# Patient Record
Sex: FEMALE | Race: OTHER | Employment: OTHER | URBAN - METROPOLITAN AREA
[De-identification: names, ages, dates, MRNs, and addresses within clinical notes are randomized per-mention and may not be internally consistent; named-entity substitution may affect disease eponyms.]

---

## 2017-10-27 ENCOUNTER — HOSPITAL ENCOUNTER (OUTPATIENT)
Dept: ULTRASOUND IMAGING | Age: 77
Discharge: HOME | End: 2017-10-27
Attending: NURSE PRACTITIONER
Payer: MEDICARE

## 2017-10-27 ENCOUNTER — HOSPITAL ENCOUNTER (OUTPATIENT)
Dept: GENERAL RADIOLOGY | Age: 77
Discharge: HOME | End: 2017-10-27
Attending: NURSE PRACTITIONER
Payer: MEDICARE

## 2017-10-27 DIAGNOSIS — M54.9 BACK PAIN: ICD-10-CM

## 2017-10-27 DIAGNOSIS — R19.06 EPIGASTRIC MASS: ICD-10-CM

## 2017-10-27 PROCEDURE — 76700 US EXAM ABDOM COMPLETE: CPT

## 2017-10-27 PROCEDURE — 72072 X-RAY EXAM THORAC SPINE 3VWS: CPT

## 2018-04-26 ENCOUNTER — HOSPITAL ENCOUNTER (OUTPATIENT)
Dept: ULTRASOUND IMAGING | Age: 78
Discharge: HOME | End: 2018-04-26
Attending: NURSE PRACTITIONER
Payer: MEDICARE

## 2018-04-26 DIAGNOSIS — R22.2 MASS IN CHEST: ICD-10-CM

## 2018-04-26 PROCEDURE — 76705 ECHO EXAM OF ABDOMEN: CPT

## 2018-05-15 ENCOUNTER — HOSPITAL ENCOUNTER (OUTPATIENT)
Dept: CT IMAGING | Age: 78
Discharge: HOME | End: 2018-05-15
Attending: NURSE PRACTITIONER
Payer: MEDICARE

## 2018-05-15 DIAGNOSIS — R19.06 EPIGASTRIC MASS: ICD-10-CM

## 2018-05-15 DIAGNOSIS — R10.13 EPIGASTRIC PAIN: ICD-10-CM

## 2018-05-15 LAB — CREAT UR-MCNC: 0.6 MG/DL (ref 0.6–1.3)

## 2018-05-15 PROCEDURE — 82565 ASSAY OF CREATININE: CPT

## 2018-05-15 PROCEDURE — 74011000255 HC RX REV CODE- 255: Performed by: RADIOLOGY

## 2018-05-15 PROCEDURE — 74177 CT ABD & PELVIS W/CONTRAST: CPT

## 2018-05-15 PROCEDURE — 74011636320 HC RX REV CODE- 636/320: Performed by: RADIOLOGY

## 2018-05-15 RX ORDER — BARIUM SULFATE 20 MG/ML
900 SUSPENSION ORAL
Status: COMPLETED | OUTPATIENT
Start: 2018-05-15 | End: 2018-05-15

## 2018-05-15 RX ADMIN — BARIUM SULFATE 900 ML: 20 SUSPENSION ORAL at 09:02

## 2018-05-15 RX ADMIN — IOPAMIDOL 90 ML: 612 INJECTION, SOLUTION INTRAVENOUS at 09:02

## 2018-05-31 ENCOUNTER — OFFICE VISIT (OUTPATIENT)
Dept: SURGERY | Age: 78
End: 2018-05-31

## 2018-05-31 VITALS
OXYGEN SATURATION: 95 % | HEIGHT: 66 IN | HEART RATE: 70 BPM | BODY MASS INDEX: 18.23 KG/M2 | SYSTOLIC BLOOD PRESSURE: 103 MMHG | WEIGHT: 113.4 LBS | RESPIRATION RATE: 20 BRPM | DIASTOLIC BLOOD PRESSURE: 53 MMHG | TEMPERATURE: 97.2 F

## 2018-05-31 DIAGNOSIS — R10.84 GENERALIZED ABDOMINAL PAIN: Primary | ICD-10-CM

## 2018-05-31 NOTE — PROGRESS NOTES
Patient presents today for possible hernia. Having pain in upper abdomen located behind her ribcage.

## 2018-05-31 NOTE — MR AVS SNAPSHOT
[address] [place] [address] [phone] 
[phone] Patient: Dora Feldman MRN: [id number]:[id number] Visit Information Date & Time Provider Department Dept. Phone Encounter #  
 5/31/2018  3:30 PM Kenneth Wayne MD [address] [phone] [id number] Upcoming Health Maintenance Date Due DTaP/Tdap/Td series (1 - Tdap) 6/16/1959 ZOSTER VACCINE AGE 60> 4/16/1998 GLAUCOMA SCREENING Q2Y 6/16/2003 Bone Densitometry (Dexa) Screening 6/16/2003 Pneumococcal 65+ Low/Medium Risk (1 of 2 - PCV13) 6/16/2003 MEDICARE YEARLY EXAM 3/14/2018 Influenza Age 5 to Adult 8/1/2018 Allergies as of 5/31/2018  Review Complete On: 5/31/2018 By: Marleni Marroquin LPN Severity Noted Reaction Type Reactions Adenosine  01/12/2015    Shortness of Breath Adenosine  01/22/2016   Intolerance Other (comments) Allegra [Fexofenadine]  01/12/2015    Shortness of Breath Sebastian Hawking [Fexofenadine]  01/22/2016    Hives Cardizem [Diltiazem Hcl]  01/12/2015    Shortness of Breath, Vertigo Rapid heart beat, hands shaking,. Headaches Cardizem [Diltiazem Hcl]  01/22/2016   Intolerance Other (comments) Levalbuterol  01/12/2015    Shortness of Breath Montelukast  01/12/2015    Rash Mucomyst [Acetylcysteine]  01/22/2016    Shortness of Breath, Other (comments) Wheezing Singulair [Montelukast]  01/22/2016    Rash, Itching Mild Zileuton  01/22/2016    Rash, Itching Mild Zyflo [Zileuton]  01/12/2015    Rash Current Immunizations  Never Reviewed No immunizations on file. Not reviewed this visit Vitals BP Pulse Temp Resp Height(growth percentile) Weight(growth percentile) 103/53 (BP 1 Location: Right arm, BP Patient Position: Sitting) 70 97.2 °F (36.2 °C) 20 5' 5.5\" (1.664 m) 113 lb 6.4 oz (51.4 kg) SpO2 BMI OB Status Smoking Status 95% 18.58 kg/m2 Hysterectomy Former Smoker BMI and BSA Data Body Mass Index Body Surface Area 18.58 kg/m 2 1.54 m 2 Preferred Pharmacy Pharmacy Name Phone [facility] [address]  [facility] [phone] Your Updated Medication List  
  
   
This list is accurate as of 5/31/18  3:40 PM.  Always use your most recent med list.  
  
  
  
  
 ALBUTEROL IN Take  by inhalation. aspirin delayed-release 81 mg tablet Take 81 mg by mouth daily. CALCIUM + D PO Take 2 tablets by mouth daily. guaiFENesin-codeine 100-10 mg/5 mL solution Commonly known as:  ROBITUSSIN AC Take 5 mL by mouth three (3) times daily as needed for Cough or Congestion. JESSE PO Take  by mouth. ondansetron 4 mg disintegrating tablet Commonly known as:  ZOFRAN ODT Take 1 tablet by mouth every eight (8) hours as needed for Nausea. [address] 7 Take 1 tablet by mouth daily. PRILOSEC PO Take 1 tablet by mouth daily. SPIRIVA WITH HANDIHALER 18 mcg inhalation capsule Generic drug:  tiotropium Take 1 capsule by inhalation daily. SYMBICORT IN Take 2 puffs by inhalation two (2) times a day. THEOPHYLLINE (BULK)  mg by Does Not Apply route nightly. verapamil 120 mg tablet Commonly known as:  CALAN Take 120 mg by mouth daily. ZYRTEC PO Take 1 tablet by mouth daily. Patient Instructions If you have any questions or concerns about today's appointment, the verbal and/or written instructions you were given for follow up care, please call our office at [phone]. [facility] [id number] [place] [address], [address] 
 
[phone] office [phone] Introducing [facility]!    
 [facility] introduces PPI patient portal. Now you can access parts of your medical record, email your doctor's office, and request medication refills online. 1. In your internet browser, go to [email or web address] 2. Click on the First Time User? Click Here link in the Sign In box. You will see the New Member Sign Up page. 3. Enter your [employer] Access Code exactly as it appears below. You will not need to use this code after youve completed the sign-up process. If you do not sign up before the expiration date, you must request a new code. · [employer] Access Code: [email or web address] Expires: 7/18/2018  1:10 PM 
 
4. Enter the last four digits of your Social Security Number (xxxx) and Date of Birth (mm/dd/yyyy) as indicated and click Submit. You will be taken to the next sign-up page. 5. Create a [employer] ID. This will be your [employer] login ID and cannot be changed, so think of one that is secure and easy to remember. 6. Create a [employer] password. You can change your password at any time. 7. Enter your Password Reset Question and Answer. This can be used at a later time if you forget your password. 8. Enter your e-mail address. You will receive e-mail notification when new information is available in [address] 9. Click Sign Up. You can now view and download portions of your medical record. 10. Click the Download Summary menu link to download a portable copy of your medical information. If you have questions, please visit the Frequently Asked Questions section of the [employer] website. Remember, [employer] is NOT to be used for urgent needs. For medical emergencies, dial 911. Now available from your iPhone and Android! Please provide this summary of care documentation to your next provider. Your primary care clinician is listed as Phys Other. If you have any questions after today's visit, please call [phone].

## 2018-05-31 NOTE — PATIENT INSTRUCTIONS
If you have any questions or concerns about today's appointment, the verbal and/or written instructions you were given for follow up care, please call our office at [phone].     [facility] - [address]    [phone] office  [phone]

## 2018-06-04 NOTE — PROGRESS NOTES
Deisy Marcelo is a 76 y.o. female was referred by PCP for evaluation of possible ventral hernia. Patient notes that she had a prior lap ventral hernia, approx 8-9 years ago. She recovered well from it, but has noted some tenderness  In the epigastrium and in the region of the mesh. She has undergone imaging for this as well. She notes that it is uncomforatble and she is able to feel a firm area which she thinks is the old mesh in the region. She present for evaluation    Past Medical History:   Diagnosis Date    Arthropathy     Asthma     Cardiac arrhythmia 1995    COPD (chronic obstructive pulmonary disease) ([facility])     Cough     Depression     Emphysema lung ([address]     Hypertension     Shingles        Past Surgical History:   Procedure Laterality Date    HX APPENDECTOMY      HX CATARACT REMOVAL  2009    HX CHOLECYSTECTOMY  2007    HX HERNIA REPAIR      HX HERNIA REPAIR  2007    HX HYSTERECTOMY      HX HYSTERECTOMY  1974    HX OOPHORECTOMY      HX OOPHORECTOMY  2007       Current Outpatient Prescriptions   Medication Sig Dispense Refill    THEOPHYLLINE ANHYDROUS (THEOPHYLLINE, BULK,)  mg by Does Not Apply route nightly.  verapamil (CALAN) 120 mg tablet Take 120 mg by mouth daily.  tiotropium (SPIRIVA WITH HANDIHALER) 18 mcg inhalation capsule Take 1 capsule by inhalation daily.  OMEPRAZOLE (PRILOSEC PO) Take 1 tablet by mouth daily.  CETIRIZINE HCL (ZYRTEC PO) Take 1 tablet by mouth daily.  CALCIUM CARBONATE/VITAMIN D3 (CALCIUM + D PO) Take 2 tablets by mouth daily.  aspirin delayed-release 81 mg tablet Take 81 mg by mouth daily.  MV,CA,MIN/IRON/FA/GUARANA/CAFF (ONE-A-DAY WOMEN'S ACTIVE PO) Take 1 tablet by mouth daily.  JESSE PO Take  by mouth.  ALBUTEROL IN Take  by inhalation.  BUDESONIDE/FORMOTEROL FUMARATE (SYMBICORT IN) Take 2 puffs by inhalation two (2) times a day.       guaiFENesin-codeine (ROBITUSSIN AC)  mg/5 mL solution Take 5 mL by mouth three (3) times daily as needed for Cough or Congestion. 60 mL 0    ondansetron (ZOFRAN ODT) 4 mg disintegrating tablet Take 1 tablet by mouth every eight (8) hours as needed for Nausea. 12 tablet 0       Allergies   Allergen Reactions    Adenosine Shortness of Breath    Adenosine Other (comments)    Allegra [Fexofenadine] Shortness of Breath    Allegra [Fexofenadine] Hives    Cardizem [Diltiazem Hcl] Shortness of Breath and Vertigo     Rapid heart beat, hands shaking,.  Headaches      Cardizem [Diltiazem Hcl] Other (comments)    Levalbuterol Shortness of Breath    Montelukast Rash    Mucomyst [Acetylcysteine] Shortness of Breath and Other (comments)     Wheezing    Singulair [Montelukast] Rash and Itching     Mild      Zileuton Rash and Itching     Mild      Zyflo [Zileuton] Rash       Family History   Problem Relation Age of Onset    Cancer Father     Stroke Father        Social History   Substance Use Topics    Smoking status: Former Smoker     Packs/day: 0.75     Years: 20.00     Types: Cigarettes     Quit date: 3/1/1992    Smokeless tobacco: Never Used    Alcohol use No          ROS, positive where in bold:    General: fevers, chills, night sweats, fatigue, weight loss, weight gain    GI: abdominal pain, nausea, vomiting, change in bowel habits, hematochezia, melena, GERD    Integumentary: dermatitis or abnormal moles    HEENT:  visual changes, vertigo, epistaxis, dysphagia, hoarseness    Cardiac: chest pain, palpitations, hypertension, edema,  varicosities    Resp:  cough, shortness of breath, wheezing, hemoptysis, snoring,  reactive airway disease    : hematuria, dysuria, frequency, urgency, nocturia, stress urinary incontinence    MSK: weakness, joint pain, arthritis    Endocrine: diabetes, thyroid disease, polyuria, polydipsia, polyphagia, poor wound healing, heat intolerance,cold intolerance    Lymph/Heme: anemia, bruising,  history of blood transfusions    Neuro: dizziness, headache, fainting, seizures, stroke    Psychiatry:  Anxiety, depression, psychosis     Objective:     Physical Exam:  Visit Vitals    /53 (BP 1 Location: Right arm, BP Patient Position: Sitting)    Pulse 70    Temp 97.2 °F (36.2 °C)    Resp 20    Ht 5' 5.5\" (1.664 m)    Wt 51.4 kg (113 lb 6.4 oz)    SpO2 95%    BMI 18.58 kg/m2       General: Elderly, chronically ill appearing  76 y.o. female on home O2  ENMT: normocephalic, atraumatic mouth:clear, no overt lesions, oral mucosa pink and moist  Neck: supple, no masses, no adenopathy or carotid bruits, trachea midline  Skin: warm, smooth, dry and well perfused  Respiratory: clear to auscultation bilaterally, no wheeze, rhonchi or rales, excursions normal and symmetrical  Cardiovascular: Regular rate and rhythm, no murmurs, clicks, gallops or rubs, no edema or varicosities  Gastrointestinal: soft, very slender, midepigastrium tender to palpation with foreign body palpable below skin. No hernia palpable  Musculoskeletal: warm, well-perfused, no tenderness or swelling, normal gait/station  Neuro: sensation and strength grossly intact and symmetrical  Psych: alert and oriented to person, place and time      CT reviewed with patient; no evidence of recurrent hernia; prior mesh visible in area of discomfort, very thin abdominal wall and very close proximity of mesh to skin due to body habitus. Assessment:   Patient with palpable prior mesh in area of prior repair. No evidence of recurrence of hernia. This palpable mesh is likely due to a comibination of the type of mesh used and the patient's body habitus. Discussed options with patient; unfortunately, to remove all of the old mesh may require a lapartomy and would leave a very weakened abdominal wall which would likely leave us needing to place more mesh. Patient does not wish to undergo any further surgery at this time for this problem, which is quite reasonable. Will manage patient conservatively. Patient to followup with me prn.     Plan:     As above

## 2018-08-23 ENCOUNTER — HOSPITAL ENCOUNTER (EMERGENCY)
Age: 78
Discharge: HOME | End: 2018-08-24
Attending: EMERGENCY MEDICINE
Payer: MEDICARE

## 2018-08-23 ENCOUNTER — APPOINTMENT (OUTPATIENT)
Dept: GENERAL RADIOLOGY | Age: 78
End: 2018-08-23
Attending: EMERGENCY MEDICINE
Payer: MEDICARE

## 2018-08-23 DIAGNOSIS — J44.1 ACUTE EXACERBATION OF CHRONIC OBSTRUCTIVE PULMONARY DISEASE (COPD) (HCC): Primary | ICD-10-CM

## 2018-08-23 LAB
ANION GAP SERPL CALC-SCNC: 9 MMOL/L (ref 3–18)
ARTERIAL PATENCY WRIST A: ABNORMAL
BASE EXCESS BLD CALC-SCNC: 5 MMOL/L
BASOPHILS # BLD: 0 K/UL (ref 0–0.1)
BASOPHILS NFR BLD: 0 % (ref 0–2)
BDY SITE: ABNORMAL
BODY TEMPERATURE: 100.2
BUN SERPL-MCNC: 21 MG/DL (ref 7–18)
BUN/CREAT SERPL: 38 (ref 12–20)
CALCIUM SERPL-MCNC: 8.4 MG/DL (ref 8.5–10.1)
CHLORIDE SERPL-SCNC: 108 MMOL/L (ref 100–108)
CK MB CFR SERPL CALC: 2 % (ref 0–4)
CK MB SERPL-MCNC: 1.3 NG/ML (ref 5–25)
CK SERPL-CCNC: 66 U/L (ref 26–192)
CO2 SERPL-SCNC: 29 MMOL/L (ref 21–32)
CREAT SERPL-MCNC: 0.55 MG/DL (ref 0.6–1.3)
DIFFERENTIAL METHOD BLD: ABNORMAL
EOSINOPHIL # BLD: 0.1 K/UL (ref 0–0.4)
EOSINOPHIL NFR BLD: 1 % (ref 0–5)
ERYTHROCYTE [DISTWIDTH] IN BLOOD BY AUTOMATED COUNT: 15.2 % (ref 11.6–14.5)
GAS FLOW.O2 O2 DELIVERY SYS: ABNORMAL L/MIN
GLUCOSE SERPL-MCNC: 122 MG/DL (ref 74–99)
HCO3 BLD-SCNC: 29.5 MMOL/L (ref 22–26)
HCT VFR BLD AUTO: 45 % (ref 35–45)
HGB BLD-MCNC: 14.1 G/DL (ref 12–16)
LACTATE BLD-SCNC: 2.8 MMOL/L (ref 0.4–2)
LYMPHOCYTES # BLD: 0.6 K/UL (ref 0.9–3.6)
LYMPHOCYTES NFR BLD: 4 % (ref 21–52)
MCH RBC QN AUTO: 30.3 PG (ref 24–34)
MCHC RBC AUTO-ENTMCNC: 31.3 G/DL (ref 31–37)
MCV RBC AUTO: 96.6 FL (ref 74–97)
MONOCYTES # BLD: 0 K/UL (ref 0.05–1.2)
MONOCYTES NFR BLD: 0 % (ref 3–10)
NEUTS SEG # BLD: 15.4 K/UL (ref 1.8–8)
NEUTS SEG NFR BLD: 95 % (ref 40–73)
O2/TOTAL GAS SETTING VFR VENT: 40 %
PCO2 BLD: 50.3 MMHG (ref 35–45)
PEEP RESPIRATORY: 8 CMH2O
PH BLD: 7.38 [PH] (ref 7.35–7.45)
PIP ISTAT,IPIP: 16
PLATELET # BLD AUTO: 182 K/UL (ref 135–420)
PMV BLD AUTO: 10.9 FL (ref 9.2–11.8)
PO2 BLD: 151 MMHG (ref 80–100)
POTASSIUM SERPL-SCNC: 4.2 MMOL/L (ref 3.5–5.5)
PRESSURE SUPPORT SETTING VENT: 8 CMH2O
RBC # BLD AUTO: 4.66 M/UL (ref 4.2–5.3)
SAO2 % BLD: 99 % (ref 92–97)
SERVICE CMNT-IMP: ABNORMAL
SODIUM SERPL-SCNC: 146 MMOL/L (ref 136–145)
SPECIMEN TYPE: ABNORMAL
TOTAL RESP. RATE, ITRR: 25
TROPONIN I SERPL-MCNC: <0.02 NG/ML (ref 0–0.04)
WBC # BLD AUTO: 16.2 K/UL (ref 4.6–13.2)

## 2018-08-23 PROCEDURE — 82550 ASSAY OF CK (CPK): CPT | Performed by: EMERGENCY MEDICINE

## 2018-08-23 PROCEDURE — 36600 WITHDRAWAL OF ARTERIAL BLOOD: CPT

## 2018-08-23 PROCEDURE — 74011000250 HC RX REV CODE- 250: Performed by: EMERGENCY MEDICINE

## 2018-08-23 PROCEDURE — 74011250636 HC RX REV CODE- 250/636: Performed by: EMERGENCY MEDICINE

## 2018-08-23 PROCEDURE — 77030029684 HC NEB SM VOL KT MONA -A

## 2018-08-23 PROCEDURE — 94660 CPAP INITIATION&MGMT: CPT

## 2018-08-23 PROCEDURE — 87040 BLOOD CULTURE FOR BACTERIA: CPT | Performed by: EMERGENCY MEDICINE

## 2018-08-23 PROCEDURE — 93005 ELECTROCARDIOGRAM TRACING: CPT

## 2018-08-23 PROCEDURE — 96365 THER/PROPH/DIAG IV INF INIT: CPT

## 2018-08-23 PROCEDURE — 71045 X-RAY EXAM CHEST 1 VIEW: CPT

## 2018-08-23 PROCEDURE — 82803 BLOOD GASES ANY COMBINATION: CPT

## 2018-08-23 PROCEDURE — 83605 ASSAY OF LACTIC ACID: CPT

## 2018-08-23 PROCEDURE — 99285 EMERGENCY DEPT VISIT HI MDM: CPT

## 2018-08-23 PROCEDURE — 80048 BASIC METABOLIC PNL TOTAL CA: CPT | Performed by: EMERGENCY MEDICINE

## 2018-08-23 PROCEDURE — 94640 AIRWAY INHALATION TREATMENT: CPT

## 2018-08-23 PROCEDURE — 85025 COMPLETE CBC W/AUTO DIFF WBC: CPT | Performed by: EMERGENCY MEDICINE

## 2018-08-23 PROCEDURE — 96375 TX/PRO/DX INJ NEW DRUG ADDON: CPT

## 2018-08-23 RX ORDER — IPRATROPIUM BROMIDE AND ALBUTEROL SULFATE 2.5; .5 MG/3ML; MG/3ML
3 SOLUTION RESPIRATORY (INHALATION) ONCE
Status: COMPLETED | OUTPATIENT
Start: 2018-08-23 | End: 2018-08-23

## 2018-08-23 RX ORDER — IPRATROPIUM BROMIDE AND ALBUTEROL SULFATE 2.5; .5 MG/3ML; MG/3ML
3 SOLUTION RESPIRATORY (INHALATION)
Status: COMPLETED | OUTPATIENT
Start: 2018-08-23 | End: 2018-08-23

## 2018-08-23 RX ORDER — LEVOFLOXACIN 5 MG/ML
500 INJECTION, SOLUTION INTRAVENOUS
Status: COMPLETED | OUTPATIENT
Start: 2018-08-23 | End: 2018-08-23

## 2018-08-23 RX ADMIN — LEVOFLOXACIN 500 MG: 5 INJECTION, SOLUTION INTRAVENOUS at 22:15

## 2018-08-23 RX ADMIN — IPRATROPIUM BROMIDE AND ALBUTEROL SULFATE 3 ML: .5; 3 SOLUTION RESPIRATORY (INHALATION) at 22:13

## 2018-08-23 RX ADMIN — METHYLPREDNISOLONE SODIUM SUCCINATE 125 MG: 125 INJECTION, POWDER, FOR SOLUTION INTRAMUSCULAR; INTRAVENOUS at 22:15

## 2018-08-24 VITALS
HEART RATE: 95 BPM | SYSTOLIC BLOOD PRESSURE: 131 MMHG | OXYGEN SATURATION: 97 % | DIASTOLIC BLOOD PRESSURE: 80 MMHG | TEMPERATURE: 100.1 F | WEIGHT: 114 LBS | BODY MASS INDEX: 18.68 KG/M2 | RESPIRATION RATE: 19 BRPM

## 2018-08-24 LAB
ATRIAL RATE: 112 BPM
CALCULATED P AXIS, ECG09: 81 DEGREES
CALCULATED R AXIS, ECG10: 27 DEGREES
CALCULATED T AXIS, ECG11: 91 DEGREES
DIAGNOSIS, 93000: NORMAL
LACTATE BLD-SCNC: 1.6 MMOL/L (ref 0.4–2)
P-R INTERVAL, ECG05: 176 MS
Q-T INTERVAL, ECG07: 318 MS
QRS DURATION, ECG06: 70 MS
QTC CALCULATION (BEZET), ECG08: 434 MS
VENTRICULAR RATE, ECG03: 112 BPM

## 2018-08-24 PROCEDURE — 74011250636 HC RX REV CODE- 250/636: Performed by: EMERGENCY MEDICINE

## 2018-08-24 PROCEDURE — 96361 HYDRATE IV INFUSION ADD-ON: CPT

## 2018-08-24 PROCEDURE — 83605 ASSAY OF LACTIC ACID: CPT

## 2018-08-24 PROCEDURE — 74011250637 HC RX REV CODE- 250/637: Performed by: EMERGENCY MEDICINE

## 2018-08-24 RX ORDER — LEVOFLOXACIN 500 MG/1
500 TABLET, FILM COATED ORAL DAILY
Qty: 10 TAB | Refills: 0 | Status: SHIPPED | OUTPATIENT
Start: 2018-08-24 | End: 2018-09-03

## 2018-08-24 RX ORDER — PREDNISONE 20 MG/1
60 TABLET ORAL DAILY
Qty: 15 TAB | Refills: 0 | Status: SHIPPED | OUTPATIENT
Start: 2018-08-24 | End: 2018-08-29

## 2018-08-24 RX ORDER — ACETAMINOPHEN 500 MG
1000 TABLET ORAL
Status: COMPLETED | OUTPATIENT
Start: 2018-08-24 | End: 2018-08-24

## 2018-08-24 RX ADMIN — ACETAMINOPHEN 1000 MG: 500 TABLET, FILM COATED ORAL at 00:42

## 2018-08-24 RX ADMIN — SODIUM CHLORIDE 1000 ML: 900 INJECTION, SOLUTION INTRAVENOUS at 00:14

## 2018-08-24 NOTE — ED PROVIDER NOTES
[facility] EMERGENCY DEPT      10:01 PM    Date: 8/23/2018  Patient Name: Brissa Green    History of Presenting Illness     Chief Complaint   Patient presents with    Respiratory Distress       History Provided By: Patient and EMS    Chief Complaint: shortness of breath  Duration:  4 hours  Timing:  Acute on chronic  Severity: moderate to severe  Modifying Factors: chronic SOB, history of COPD  Associated Symptoms: denies any associated symptoms    [de-identified] y.o. female with noted past medical history who presents to the emergency department with moderate to severe, acute on chronic shortness of breath that started at 6 PM today. Patient denies any cough, fevers, chills. She has a nebulizing machine at home. Patient has a history of A-fib, chronic SOB, COPD. She notes that she quit tobacco usage 26 years ago. No other complaints. Nursing notes regarding the HPI and triage nursing notes were reviewed. Prior medical records were reviewed. Current Outpatient Prescriptions   Medication Sig Dispense Refill    THEOPHYLLINE ANHYDROUS (THEOPHYLLINE, BULK,)  mg by Does Not Apply route nightly.  verapamil (CALAN) 120 mg tablet Take 120 mg by mouth daily.  tiotropium (SPIRIVA WITH HANDIHALER) 18 mcg inhalation capsule Take 1 capsule by inhalation daily.  OMEPRAZOLE (PRILOSEC PO) Take 1 tablet by mouth daily.  CETIRIZINE HCL (ZYRTEC PO) Take 1 tablet by mouth daily.  CALCIUM CARBONATE/VITAMIN D3 (CALCIUM + D PO) Take 2 tablets by mouth daily.  aspirin delayed-release 81 mg tablet Take 81 mg by mouth daily.  MV,CA,MIN/IRON/FA/GUARANA/CAFF (ONE-A-DAY WOMEN'S ACTIVE PO) Take 1 tablet by mouth daily.  JESSE PO Take  by mouth.  ALBUTEROL IN Take  by inhalation.  BUDESONIDE/FORMOTEROL FUMARATE (SYMBICORT IN) Take 2 puffs by inhalation two (2) times a day.       guaiFENesin-codeine (ROBITUSSIN AC)  mg/5 mL solution Take 5 mL by mouth three (3) times daily as needed for Cough or Congestion. 60 mL 0    ondansetron (ZOFRAN ODT) 4 mg disintegrating tablet Take 1 tablet by mouth every eight (8) hours as needed for Nausea. 12 tablet 0       Past History     Past Medical History:  Past Medical History:   Diagnosis Date    Arthropathy     Asthma     Cardiac arrhythmia 1995    COPD (chronic obstructive pulmonary disease) ([address]     Cough     Depression     Emphysema lung ([address]     Hypertension     Shingles        Past Surgical History:  Past Surgical History:   Procedure Laterality Date    HX APPENDECTOMY      HX CATARACT REMOVAL  2009    HX CHOLECYSTECTOMY  2007    HX HERNIA REPAIR      HX HERNIA REPAIR  2007    HX HYSTERECTOMY      HX HYSTERECTOMY  1974    HX OOPHORECTOMY      HX OOPHORECTOMY  2007       Family History:  Family History   Problem Relation Age of Onset    Cancer Father     Stroke Father        Social History:  Social History   Substance Use Topics    Smoking status: Former Smoker     Packs/day: 0.75     Years: 20.00     Types: Cigarettes     Quit date: 3/1/1992    Smokeless tobacco: Never Used    Alcohol use No       Allergies: Allergies   Allergen Reactions    Adenosine Shortness of Breath    Adenosine Other (comments)    Allegra [Fexofenadine] Shortness of Breath    Allegra [Fexofenadine] Hives    Cardizem [Diltiazem Hcl] Shortness of Breath and Vertigo     Rapid heart beat, hands shaking,. Headaches      Cardizem [Diltiazem Hcl] Other (comments)    Levalbuterol Shortness of Breath    Montelukast Rash    Mucomyst [Acetylcysteine] Shortness of Breath and Other (comments)     Wheezing    Singulair [Montelukast] Rash and Itching     Mild      Zileuton Rash and Itching     Mild      Zyflo [Zileuton] Rash       Patient's primary care provider (as noted in EPIC):  Maria Elena Higgins MD    Review of Systems   Constitutional: Negative for chills, diaphoresis and fever. HENT: Negative for congestion.     Eyes: Negative for discharge. Respiratory: Positive for shortness of breath. Negative for cough and stridor. Cardiovascular: Negative for palpitations. Gastrointestinal: Negative for diarrhea. Genitourinary: Negative for flank pain. Musculoskeletal: Negative for back pain. Neurological: Negative for weakness. Psychiatric/Behavioral: Negative for hallucinations. All other systems reviewed and are negative. Visit Vitals    /71    Pulse 98    Temp 100.1 °F (37.8 °C)    Resp 17    Wt 51.7 kg (114 lb)    SpO2 98%    BMI 18.68 kg/m2       PHYSICAL EXAM:    CONSTITUTIONAL:  Alert, in no apparent distress;  well developed;  well nourished. HEAD:  Normocephalic, atraumatic. EYES:  EOMI. Non-icteric sclera. Normal conjunctiva. ENTM:  Nose:  no rhinorrhea. Throat:  no erythema or exudate, mucous membranes moist.  NECK:  No JVD. Supple    RESPIRATORY:   Diffuse whole expiratory wheeze, but good air movement. CARDIOVASCULAR:  Regular rate and rhythm. No murmurs, rubs, or gallops. GI:  Normal bowel sounds, abdomen soft and non-tender. No rebound or guarding. BACK:  Non-tender. UPPER EXT:  Normal inspection. LOWER EXT:  No edema, no calf tenderness. Distal pulses intact. NEURO:  Moves all four extremities, and grossly normal motor exam.  SKIN:  No rashes;  Normal for age. PSYCH:  Alert and normal affect. DIFFERENTIAL DIAGNOSES/ MEDICAL DECISION MAKING:   Shortness of breath etiologies include chronic obstructive pulmonary disease (COPD), acute asthma exacerbation, congestive heart failure, pneumonia, acute bronchitis, pulmonary embolism, upper respiratory infection, cardiac event to include acute coronary syndrome, acute myocardial infarction or a combination of the above (ex URI on top of COPD thus causing respiratory distress).       Diagnostic Study Results     Abnormal lab results from this emergency department encounter:  Labs Reviewed   CBC WITH AUTOMATED DIFF - Abnormal; Notable for the following:        Result Value    WBC 16.2 (*)     RDW 15.2 (*)     NEUTROPHILS 95 (*)     LYMPHOCYTES 4 (*)     MONOCYTES 0 (*)     ABS. NEUTROPHILS 15.4 (*)     ABS. LYMPHOCYTES 0.6 (*)     ABS. MONOCYTES 0.0 (*)     All other components within normal limits   METABOLIC PANEL, BASIC - Abnormal; Notable for the following:     Sodium 146 (*)     Glucose 122 (*)     BUN 21 (*)     Creatinine 0.55 (*)     BUN/Creatinine ratio 38 (*)     Calcium 8.4 (*)     All other components within normal limits   POC LACTIC ACID - Abnormal; Notable for the following:     Lactic Acid (POC) 2.8 (*)     All other components within normal limits   POC G3 - Abnormal; Notable for the following:     pCO2 (POC) 50.3 (*)     pO2 (POC) 151 (*)     HCO3 (POC) 29.5 (*)     sO2 (POC) 99 (*)     All other components within normal limits   CULTURE, BLOOD   CULTURE, BLOOD   CARDIAC PANEL,(CK, CKMB & TROPONIN)   BLOOD GAS, ARTERIAL       Lab values for this patient within approximately the last 12 hours:  Recent Results (from the past 12 hour(s))   CULTURE, BLOOD    Collection Time: 08/23/18  9:40 PM   Result Value Ref Range    Special Requests: PERIPHERAL      Culture result: PENDING    CBC WITH AUTOMATED DIFF    Collection Time: 08/23/18  9:55 PM   Result Value Ref Range    WBC 16.2 (H) 4.6 - 13.2 K/uL    RBC 4.66 4.20 - 5.30 M/uL    HGB 14.1 12.0 - 16.0 g/dL    HCT 45.0 35.0 - 45.0 %    MCV 96.6 74.0 - 97.0 FL    MCH 30.3 24.0 - 34.0 PG    MCHC 31.3 31.0 - 37.0 g/dL    RDW 15.2 (H) 11.6 - 14.5 %    PLATELET 205 954 - 853 K/uL    MPV 10.9 9.2 - 11.8 FL    NEUTROPHILS 95 (H) 40 - 73 %    LYMPHOCYTES 4 (L) 21 - 52 %    MONOCYTES 0 (L) 3 - 10 %    EOSINOPHILS 1 0 - 5 %    BASOPHILS 0 0 - 2 %    ABS. NEUTROPHILS 15.4 (H) 1.8 - 8.0 K/UL    ABS. LYMPHOCYTES 0.6 (L) 0.9 - 3.6 K/UL    ABS. MONOCYTES 0.0 (L) 0.05 - 1.2 K/UL    ABS. EOSINOPHILS 0.1 0.0 - 0.4 K/UL    ABS.  BASOPHILS 0.0 0.0 - 0.1 K/UL    DF AUTOMATED     METABOLIC PANEL, BASIC    Collection Time: 08/23/18  9:55 PM   Result Value Ref Range    Sodium 146 (H) 136 - 145 mmol/L    Potassium 4.2 3.5 - 5.5 mmol/L    Chloride 108 100 - 108 mmol/L    CO2 29 21 - 32 mmol/L    Anion gap 9 3.0 - 18 mmol/L    Glucose 122 (H) 74 - 99 mg/dL    BUN 21 (H) 7.0 - 18 MG/DL    Creatinine 0.55 (L) 0.6 - 1.3 MG/DL    BUN/Creatinine ratio 38 (H) 12 - 20      GFR est AA >60 >60 ml/min/1.73m2    GFR est non-AA >60 >60 ml/min/1.73m2    Calcium 8.4 (L) 8.5 - 10.1 MG/DL   CARDIAC PANEL,(CK, CKMB & TROPONIN)    Collection Time: 08/23/18  9:55 PM   Result Value Ref Range    CK 66 26 - 192 U/L    CK - MB 1.3 <3.6 ng/ml    CK-MB Index 2.0 0.0 - 4.0 %    Troponin-I, Qt. <0.02 0.0 - 0.045 NG/ML   CULTURE, BLOOD    Collection Time: 08/23/18  9:55 PM   Result Value Ref Range    Special Requests: PERIPHERAL      Culture result: PENDING    POC LACTIC ACID    Collection Time: 08/23/18  9:58 PM   Result Value Ref Range    Lactic Acid (POC) 2.8 (HH) 0.4 - 2.0 mmol/L   EKG, 12 LEAD, INITIAL    Collection Time: 08/23/18 10:07 PM   Result Value Ref Range    Ventricular Rate 112 BPM    Atrial Rate 112 BPM    P-R Interval 176 ms    QRS Duration 70 ms    Q-T Interval 318 ms    QTC Calculation (Bezet) 434 ms    Calculated P Axis 81 degrees    Calculated R Axis 27 degrees    Calculated T Axis 91 degrees    Diagnosis       Sinus tachycardia with premature atrial complexes  Abnormal QRS-T angle, consider primary T wave abnormality  Abnormal ECG  No previous ECGs available     POC G3    Collection Time: 08/23/18 11:34 PM   Result Value Ref Range    Device: BIPAP      FIO2 (POC) 40 %    pH (POC) 7.381 7.35 - 7.45      pCO2 (POC) 50.3 (H) 35.0 - 45.0 MMHG    pO2 (POC) 151 (H) 80 - 100 MMHG    HCO3 (POC) 29.5 (H) 22 - 26 MMOL/L    sO2 (POC) 99 (H) 92 - 97 %    Base excess (POC) 5 mmol/L    PEEP/CPAP (POC) 8 cmH2O    PIP (POC) 16      Pressure support 8 cmH2O    Allens test (POC) N/A      Total resp.  rate 25      Site RIGHT RADIAL      Patient temp. 100.2 Specimen type (POC) ARTERIAL      Performed by Geovani Carias        Radiologist and cardiologist interpretations if available at time of this note:  No results found. XR Chest Port interpreted by Dr. Andrew Raygoza at 11:00 PM.   No acute process. Interpreted by ED Physician:  Cardiac Monitor Strip interpretation is Sinus Tachycardia about 110 bpm, No ST changes noted, NORMAL WIDTH QRS. 12 lead EKG interpreted by ED Physician is Sinus Tachycardia about 110 bpm with PAC's noted, non-specific EKG. Medication(s) ordered for patient during this emergency visit encounter:  Medications   albuterol-ipratropium (DUO-NEB) 2.5 MG-0.5 MG/3 ML (3 mL Nebulization Given 8/23/18 2213)   albuterol-ipratropium (DUO-NEB) 2.5 MG-0.5 MG/3 ML (3 mL Nebulization Given 8/23/18 2213)   albuterol-ipratropium (DUO-NEB) 2.5 MG-0.5 MG/3 ML (3 mL Nebulization Given 8/23/18 2213)   albuterol-ipratropium (DUO-NEB) 2.5 MG-0.5 MG/3 ML (3 mL Nebulization Given 8/23/18 2213)   methylPREDNISolone (PF) (SOLU-MEDROL) injection 125 mg (125 mg IntraVENous Given 8/23/18 2215)   levoFLOXacin (LEVAQUIN) 500 mg in D5W IVPB (0 mg IntraVENous IV Completed 8/23/18 2315)   sodium chloride 0.9 % bolus infusion 1,000 mL (1,000 mL IntraVENous New Bag 8/24/18 0014)   acetaminophen (TYLENOL) tablet 1,000 mg (1,000 mg Oral Given 8/24/18 0042)       Medical Decision Making     I am the first provider for this patient. I reviewed the vital signs, available nursing notes, past medical history, past surgical history, family history and social history. Vital Signs:  Reviewed the patient's vital signs. 1:36 AM  Repeat lactic 1.6. Patient no longer wheezing, feel better and wants to be discharged home. IMPRESSION AND MEDICAL DECISION MAKING:  Based upon the patient's presentation with noted HPI and PE, along with the work up done in the emergency department, I believe that the patient is having an acute COPD exacerbation.         The patients respiratory status improved in the emergency department with noted HHN treatments, steroids, and other noted modalities and medications. I believe that the patient has improved sufficiently enough for discharge home. DIAGNOSIS:    1. Acute COPD exacerbation. SPECIFIC PATIENT INSTRUCTIONS FROM THE PHYSICIAN WHO TREATED YOU IN THE ER TODAY:  1. Return if any concerns or worsening of condition(s)  2. Levaquin and steroids as prescribed until finished. 3. Use the albuterol inhaler as prescribed for wheezing and/or cough. 4. FOLLOW UP APPOINTMENT:  Your primary doctor in 1-2 days. Patient is improved, resting quietly and comfortably. The patient will be discharged home. The patient was reassured that these symptoms do not appear to represent a serious or life threatening condition at this time. Warning signs of worsening condition were discussed and understood by the patient. Based on patient's age, coexisting illness, exam, and the results of this ED evaluation, the decision to treat as an outpatient was made. Based on the information available at time of discharge, acute pathology requiring immediate intervention was deemed relative unlikely. While it is impossible to completely exclude the possibility of underlying serious disease or worsening of condition, I feel the relative likelihood is extremely low. I discussed this uncertainty with the patient, who understood ED evaluation and treatment and felt comfortable with the outpatient treatment plan. All questions regarding care, test results, and follow up were answered. The patient is stable and appropriate to discharge. They understand that they should return to the emergency department for any new or worsening symptoms. I stressed the importance of follow up for repeat assessment and possibly further evaluation/treatment. Coding Diagnoses     Clinical Impression:   1.  Acute exacerbation of chronic obstructive pulmonary disease (COPD) ([facility] [address]        Disposition     Disposition:  Home. OLEKSANDR Hernandez Board Certified Emergency Physician    Provider Attestation:  If a scribe was utilized in generation of this patient record, I personally performed the services described in the documentation, reviewed the documentation, as recorded by the scribe in my presence, and it accurately records the patient's history of presenting illness, review of systems, patient physical examination, and procedures performed by me as the attending physician. OLEKSANDR Hernandez Board Certified Emergency Physician  8/23/2018.     Scribe Attestation     Eliseo Johansen acting as a scribe for and in the presence of Anner Leventhal, MD      August 23, 2018 at 10:06 PM

## 2018-08-24 NOTE — ED TRIAGE NOTES
Pt arrived by EMS. Per EMS, pt started having SOB. Pt have hx of COPD. Pt had pneumonia this month and admitted.

## 2018-08-24 NOTE — DISCHARGE INSTRUCTIONS
SPECIFIC PATIENT INSTRUCTIONS FROM THE PHYSICIAN WHO TREATED YOU IN THE ER TODAY:  1. Return if any concerns or worsening of condition(s)  2. Levaquin and steroids as prescribed until finished. 3. Use the albuterol inhaler as prescribed for wheezing and/or cough. 4. FOLLOW UP APPOINTMENT:  Your primary doctor in 1-2 days. Chronic Obstructive Pulmonary Disease (COPD): Care Instructions  Your Care Instructions    Chronic obstructive pulmonary disease (COPD) is a general term for a group of lung diseases, including emphysema and chronic bronchitis. People with COPD have decreased airflow in and out of the lungs, which makes it hard to breathe. The airways also can get clogged with thick mucus. Cigarette smoking is a major cause of COPD. Although there is no cure for COPD, you can slow its progress. Following your treatment plan and taking care of yourself can help you feel better and live longer. Follow-up care is a key part of your treatment and safety. Be sure to make and go to all appointments, and call your doctor if you are having problems. It's also a good idea to know your test results and keep a list of the medicines you take. How can you care for yourself at home?   Staying healthy    · Do not smoke. This is the most important step you can take to prevent more damage to your lungs. If you need help quitting, talk to your doctor about stop-smoking programs and medicines. These can increase your chances of quitting for good.     · Avoid colds and flu. Get a pneumococcal vaccine shot. If you have had one before, ask your doctor whether you need a second dose. Get the flu vaccine every fall. If you must be around people with colds or the flu, wash your hands often.     · Avoid secondhand smoke, air pollution, and high altitudes. Also avoid cold, dry air and hot, humid air.  Stay at home with your windows closed when air pollution is bad.    Medicines and oxygen therapy    · Take your medicines exactly as prescribed. Call your doctor if you think you are having a problem with your medicine.     · You may be taking medicines such as:  ¨ Bronchodilators. These help open your airways and make breathing easier. Bronchodilators are either short-acting (work for 6 to 9 hours) or long-acting (work for 24 hours). You inhale most bronchodilators, so they start to act quickly. Always carry your quick-relief inhaler with you in case you need it while you are away from home. ¨ Corticosteroids (prednisone, budesonide). These reduce airway inflammation. They come in pill or inhaled form. You must take these medicines every day for them to work well.     · A spacer may help you get more inhaled medicine to your lungs. Ask your doctor or pharmacist if a spacer is right for you. If it is, ask how to use it properly.     · Do not take any vitamins, over-the-counter medicine, or herbal products without talking to your doctor first.     · If your doctor prescribed antibiotics, take them as directed. Do not stop taking them just because you feel better. You need to take the full course of antibiotics.     · Oxygen therapy boosts the amount of oxygen in your blood and helps you breathe easier. Use the flow rate your doctor has recommended, and do not change it without talking to your doctor first.   Activity    · Get regular exercise. Walking is an easy way to get exercise. Start out slowly, and walk a little more each day.     · Pay attention to your breathing. You are exercising too hard if you cannot talk while you are exercising.     · Take short rest breaks when doing household chores and other activities.     · Learn breathing methods-such as breathing through pursed lips-to help you become less short of breath.     · If your doctor has not set you up with a pulmonary rehabilitation program, talk to him or her about whether rehab is right for you.  Rehab includes exercise programs, education about your disease and how to manage it, help with diet and other changes, and emotional support. Diet    · Eat regular, healthy meals. Use bronchodilators about 1 hour before you eat to make it easier to eat. Eat several small meals instead of three large ones. Drink beverages at the end of the meal. Avoid foods that are hard to chew.     · Eat foods that contain protein so that you do not lose muscle mass.     · Talk with your doctor if you gain too much weight or if you lose weight without trying.    Mental health    · Talk to your family, friends, or a therapist about your feelings. It is normal to feel frightened, angry, hopeless, helpless, and even guilty. Talking openly about bad feelings can help you cope. If these feelings last, talk to your doctor. When should you call for help? Call 911 anytime you think you may need emergency care. For example, call if:    · You have severe trouble breathing.    Call your doctor now or seek immediate medical care if:    · You have new or worse trouble breathing.     · You cough up blood.     · You have a fever.    Watch closely for changes in your health, and be sure to contact your doctor if:    · You cough more deeply or more often, especially if you notice more mucus or a change in the color of your mucus.     · You have new or worse swelling in your legs or belly.     · You are not getting better as expected. Where can you learn more? Go to http://stephane-heidi.info/. Aldo April in the search box to learn more about \"Chronic Obstructive Pulmonary Disease (COPD): Care Instructions. \"  Current as of: December 6, 2017  Content Version: 11.7  © 0478-8746 [employer]. Care instructions adapted under license by [employer] (which disclaims liability or warranty for this information).  If you have questions about a medical condition or this instruction, always ask your healthcare professional. Lilibeth Guadarrama disclaims any warranty or liability for your use of this information. COPD Exacerbation Plan: Care Instructions  Your Care Instructions    If you have chronic obstructive pulmonary disease (COPD), your usual shortness of breath could suddenly get worse. You may start coughing more and have more mucus. This flare-up is called a COPD exacerbation (say \"dl-YRZ-sy-BAY-roger\"). A lung infection or air pollution could set off an exacerbation. Sometimes it can happen after a quick change in temperature or being around chemicals. Work with your doctor to make a plan for dealing with an exacerbation. You can better manage it if you plan ahead. Follow-up care is a key part of your treatment and safety. Be sure to make and go to all appointments, and call your doctor if you are having problems. It's also a good idea to know your test results and keep a list of the medicines you take. How can you care for yourself at home? During an exacerbation  · Do not panic if you start to have one. Quick treatment at home may help you prevent serious breathing problems. If you have a COPD exacerbation plan that you developed with your doctor, follow it. · Take your medicines exactly as your doctor tells you. ¨ Use your inhaler as directed by your doctor. If your symptoms do not get better after you use your medicine, have someone take you to the emergency room. Call an ambulance if necessary. ¨ With inhaled medicines, a spacer or a nebulizer may help you get more medicine to your lungs. Ask your doctor or pharmacist how to use them properly. Practice using the spacer in front of a mirror before you have an exacerbation. This may help you get the medicine into your lungs quickly. ¨ If your doctor has given you steroid pills, take them as directed. ¨ Your doctor may have given you a prescription for antibiotics, which you can fill if you need it. ¨ Talk to your doctor if you have any problems with your medicine.  And call your doctor if you have to use your antibiotic or steroid pills. Preventing an exacerbation  · Do not smoke. This is the most important step you can take to prevent more damage to your lungs and prevent problems. If you already smoke, it is never too late to stop. If you need help quitting, talk to your doctor about stop-smoking programs and medicines. These can increase your chances of quitting for good. · Take your daily medicines as prescribed. · Avoid colds and flu. ¨ Get a pneumococcal vaccine. ¨ Get a flu vaccine each year, as soon as it is available. Ask those you live or work with to do the same, so they will not get the flu and infect you. ¨ Try to stay away from people with colds or the flu. ¨ Wash your hands often. · Avoid secondhand smoke; air pollution; cold, dry air; hot, humid air; and high altitudes. Stay at home with your windows closed when air pollution is bad. · Learn breathing techniques for COPD, such as breathing through pursed lips. These techniques can help you breathe easier during an exacerbation. When should you call for help? Call 911 anytime you think you may need emergency care. For example, call if:    · You have severe trouble breathing.     · You have severe chest pain.    Call your doctor now or seek immediate medical care if:    · You have new or worse shortness of breath.     · You develop new chest pain.     · You are coughing more deeply or more often, especially if you notice more mucus or a change in the color of your mucus.     · You cough up blood.     · You have new or increased swelling in your legs or belly.     · You have a fever.    Watch closely for changes in your health, and be sure to contact your doctor if:    · You need to use your antibiotic or steroid pills.     · Your symptoms are getting worse. Where can you learn more? Go to http://stephane-heidi.info/. Enter Y436 in the search box to learn more about \"COPD Exacerbation Plan: Care Instructions. \"  Current as of: 2017  Content Version: 11.7  © 3771-4112 [employer]. Care instructions adapted under license by [employer] (which disclaims liability or warranty for this information). If you have questions about a medical condition or this instruction, always ask your healthcare professional. [address] any warranty or liability for your use of this information. MyChart Activation    Thank you for requesting access to [employer]. Please follow the instructions below to securely access and download your online medical record. [employer] allows you to send messages to your doctor, view your test results, renew your prescriptions, schedule appointments, and more. How Do I Sign Up? 1. In your internet browser, go to [email or web address] [email or web address]. 2. Click on the First Time User? Click Here link in the Sign In box. You will see the New Member Sign Up page. 3. Enter your [employer] Access Code exactly as it appears below. You will not need to use this code after youve completed the sign-up process. If you do not sign up before the expiration date, you must request a new code. [employer] Access Code: [email or web address]  Expires: 2018  9:48 PM (This is the date your [employer] access code will )    4. Enter the last four digits of your Social Security Number (xxxx) and Date of Birth (mm/dd/yyyy) as indicated and click Submit. You will be taken to the next sign-up page. 5. Create a [employer] ID. This will be your [employer] login ID and cannot be changed, so think of one that is secure and easy to remember. 6. Create a [employer] password. You can change your password at any time. 7. Enter your Password Reset Question and Answer. This can be used at a later time if you forget your password. 8. Enter your e-mail address. You will receive e-mail notification when new information is available in [address] 9. Click Sign Up.  You can now view and download portions of your medical record. 10. Click the Download Summary menu link to download a portable copy of your medical information. Additional Information    If you have questions, please visit the Frequently Asked Questions section of the [employer] website at [email or web address] Remember, [employer] is NOT to be used for urgent needs. For medical emergencies, dial 911.

## 2018-08-24 NOTE — ED NOTES
I have reviewed discharge instructions with the patient and daughter. The patient and daughter verbalized understanding. Patient armband removed and shredded

## 2018-08-24 NOTE — PROGRESS NOTES
Rt called to ER. Patient is placed on Bipap without complication. ABg is drawn and result given to physician. RN removed Bipap Mask and placed on nasal cannula. Heart rate 93. Oxygen sat 95%  RR 24. Will continue  To monitor.

## 2018-08-29 LAB
BACTERIA SPEC CULT: NORMAL
BACTERIA SPEC CULT: NORMAL
SERVICE CMNT-IMP: NORMAL
SERVICE CMNT-IMP: NORMAL